# Patient Record
Sex: FEMALE
[De-identification: names, ages, dates, MRNs, and addresses within clinical notes are randomized per-mention and may not be internally consistent; named-entity substitution may affect disease eponyms.]

---

## 2021-08-25 ENCOUNTER — NURSE TRIAGE (OUTPATIENT)
Dept: OTHER | Facility: CLINIC | Age: 36
End: 2021-08-25

## 2021-08-25 NOTE — TELEPHONE ENCOUNTER
Brief description of triage: Pt states on 08/21/21 she ran into a bush with needles and referred to it as a Bur bush. Pt states she had to remove some of the needles with tweezers. Pt then states a couple of days later she developed a rash with itching on the side where the needles were. See assessment below. Triage indicates for patient to see PCP within the next 3 days. Care advice provided, patient verbalizes understanding; denies any other questions or concerns; instructed to call back for any new or worsening symptoms. This triage is a result of a [place] [facility] [place] Please do not respond to the triage nurse through this encounter. Any subsequent communication should be directly with the patient. Reason for Disposition   [1] Severe localized itching AND [2] after 2 days of steroid cream    Answer Assessment - Initial Assessment Questions  1. APPEARANCE of RASH: \"Describe the rash. \"      Scattered raised bumps    2. LOCATION: \"Where is the rash located? \"       On right side    3. NUMBER: \"How many spots are there? \"       Scattered    4. SIZE: \"How big are the spots? \" (Inches, centimeters or compare to size of a coin)       Varies size of eraser to pin tip    5. ONSET: \"When did the rash start? \"       08/23/21    6. ITCHING: \"Does the rash itch? \" If so, ask: \"How bad is the itch? \"  (Scale 1-10; or mild, moderate, severe)      Moderately    7. PAIN: \"Does the rash hurt? \" If so, ask: \"How bad is the pain? \"  (Scale 1-10; or mild, moderate, severe)      0/10    8. OTHER SYMPTOMS: \"Do you have any other symptoms? \" (e.g., fever)      Denies    9. PREGNANCY: \"Is there any chance you are pregnant? \" \"When was your last menstrual period? \"      Denies 08/1/021    Protocols used: RASH OR REDNESS - LOCALIZED-ADULT-

## 2024-04-25 ENCOUNTER — APPOINTMENT (OUTPATIENT)
Dept: CARDIOLOGY | Facility: HOSPITAL | Age: 39
End: 2024-04-25
Payer: COMMERCIAL

## 2024-04-25 ENCOUNTER — APPOINTMENT (OUTPATIENT)
Dept: RADIOLOGY | Facility: HOSPITAL | Age: 39
End: 2024-04-25
Payer: COMMERCIAL

## 2024-04-25 ENCOUNTER — HOSPITAL ENCOUNTER (EMERGENCY)
Facility: HOSPITAL | Age: 39
Discharge: HOME | End: 2024-04-25
Payer: COMMERCIAL

## 2024-04-25 VITALS
HEART RATE: 95 BPM | RESPIRATION RATE: 16 BRPM | SYSTOLIC BLOOD PRESSURE: 140 MMHG | HEIGHT: 64 IN | OXYGEN SATURATION: 95 % | DIASTOLIC BLOOD PRESSURE: 93 MMHG | WEIGHT: 210 LBS | TEMPERATURE: 97.4 F | BODY MASS INDEX: 35.85 KG/M2

## 2024-04-25 DIAGNOSIS — S22.32XA CLOSED FRACTURE OF ONE RIB OF LEFT SIDE, INITIAL ENCOUNTER: Primary | ICD-10-CM

## 2024-04-25 LAB
ATRIAL RATE: 88 BPM
P AXIS: 60 DEGREES
P OFFSET: 213 MS
P ONSET: 156 MS
PR INTERVAL: 148 MS
Q ONSET: 230 MS
QRS COUNT: 14 BEATS
QRS DURATION: 98 MS
QT INTERVAL: 396 MS
QTC CALCULATION(BAZETT): 479 MS
QTC FREDERICIA: 450 MS
R AXIS: -12 DEGREES
T AXIS: 34 DEGREES
T OFFSET: 428 MS
VENTRICULAR RATE: 88 BPM

## 2024-04-25 PROCEDURE — 71045 X-RAY EXAM CHEST 1 VIEW: CPT | Performed by: STUDENT IN AN ORGANIZED HEALTH CARE EDUCATION/TRAINING PROGRAM

## 2024-04-25 PROCEDURE — 2500000005 HC RX 250 GENERAL PHARMACY W/O HCPCS: Performed by: NURSE PRACTITIONER

## 2024-04-25 PROCEDURE — 93005 ELECTROCARDIOGRAM TRACING: CPT

## 2024-04-25 PROCEDURE — 99283 EMERGENCY DEPT VISIT LOW MDM: CPT | Mod: 25

## 2024-04-25 PROCEDURE — 71045 X-RAY EXAM CHEST 1 VIEW: CPT

## 2024-04-25 RX ORDER — LIDOCAINE 50 MG/G
1 PATCH TOPICAL DAILY
Qty: 7 PATCH | Refills: 0 | Status: SHIPPED | OUTPATIENT
Start: 2024-04-25 | End: 2024-05-02

## 2024-04-25 RX ORDER — TIZANIDINE 2 MG/1
4 TABLET ORAL 3 TIMES DAILY PRN
Qty: 180 TABLET | Refills: 0 | Status: SHIPPED | OUTPATIENT
Start: 2024-04-25 | End: 2024-05-25

## 2024-04-25 RX ORDER — NAPROXEN 500 MG/1
500 TABLET ORAL
Qty: 20 TABLET | Refills: 0 | Status: SHIPPED | OUTPATIENT
Start: 2024-04-25 | End: 2024-05-05

## 2024-04-25 RX ORDER — LIDOCAINE 560 MG/1
2 PATCH PERCUTANEOUS; TOPICAL; TRANSDERMAL ONCE
Status: DISCONTINUED | OUTPATIENT
Start: 2024-04-25 | End: 2024-04-25 | Stop reason: HOSPADM

## 2024-04-25 RX ADMIN — LIDOCAINE 4% 2 PATCH: 40 PATCH TOPICAL at 08:53

## 2024-04-25 ASSESSMENT — PAIN SCALES - GENERAL
PAINLEVEL_OUTOF10: 4
PAINLEVEL_OUTOF10: 5 - MODERATE PAIN
PAINLEVEL_OUTOF10: 4
PAINLEVEL_OUTOF10: 0 - NO PAIN

## 2024-04-25 ASSESSMENT — PAIN DESCRIPTION - LOCATION: LOCATION: CHEST

## 2024-04-25 ASSESSMENT — COLUMBIA-SUICIDE SEVERITY RATING SCALE - C-SSRS
1. IN THE PAST MONTH, HAVE YOU WISHED YOU WERE DEAD OR WISHED YOU COULD GO TO SLEEP AND NOT WAKE UP?: NO
6. HAVE YOU EVER DONE ANYTHING, STARTED TO DO ANYTHING, OR PREPARED TO DO ANYTHING TO END YOUR LIFE?: NO
2. HAVE YOU ACTUALLY HAD ANY THOUGHTS OF KILLING YOURSELF?: NO

## 2024-04-25 ASSESSMENT — PAIN DESCRIPTION - ORIENTATION: ORIENTATION: LEFT

## 2024-04-25 ASSESSMENT — PAIN - FUNCTIONAL ASSESSMENT
PAIN_FUNCTIONAL_ASSESSMENT: 0-10
PAIN_FUNCTIONAL_ASSESSMENT: 0-10

## 2024-04-25 NOTE — ED PROVIDER NOTES
HPI   Chief Complaint   Patient presents with    Chest Pain     Patient had a massage recently and when masseuse pressed down on her back she had a moment where she couldn't breathe and felt something was wrong in her chest/back area.       39-year-old female presents today with left-sided rib pain and dyspnea x 3 days.  She is also endorsed a cough that started last night.  She also endorses a headache and she has a long history of migraine headaches.  She used [employer] headache relief and was able to sleep last night.  The left rib pain is rated 5 out of 10.  She felt short of breath when walking in school.  She gave herself a COVID test last night that was negative.  The chest pain is intermittent and it usually only lasts seconds.  The cough is nonproductive.  She is not on birth control but denies pregnancy.  The last time she had sexual intercourse was 3 months ago.  She denies history of smoking.  She denies history of cancer.  She was on a long drive in February to [place].  They would stop every couple hours for gas.  She denies posterior calf pain.  Her legs usually hurt when she ambulates.  She is not on any regular prescribed medication.  She denies any recent trauma or fall.  She denies melena, hematochezia, or hematuria or dysuria.  She denies fever or chills.  Patient notes that she is not actually experiencing chest pain but instead left-sided rib pain which she believes occurred while she was at a spa and she felt something pop when the [employer] therapist pressed into the left lateral ribs.      History provided by:  Patient   used: No                        [place] Coma Scale Score: 15                     Patient History   Past Medical History:   Diagnosis Date    Other obesity due to excess calories 01/21/2021    Class 2 obesity due to excess calories without serious comorbidity with body mass index (BMI) of 36.0 to 36.9 in adult    Other specified counseling 01/21/2021     Educated about COVID-19 virus infection     Past Surgical History:   Procedure Laterality Date    OTHER SURGICAL HISTORY  01/21/2021    Wrist surgery    OTHER SURGICAL HISTORY  01/21/2021    Umbilical hernia repair     No family history on file.  Social History     Tobacco Use    Smoking status: Not on file    Smokeless tobacco: Not on file   Substance Use Topics    Alcohol use: Not on file    Drug use: Not on file       Physical Exam   ED Triage Vitals   Temp Pulse Resp BP   -- -- -- --      SpO2 Temp src Heart Rate Source Patient Position   -- -- -- --      BP Location FiO2 (%)     -- --       Physical Exam  Constitutional:       Appearance: Normal appearance.   HENT:      Head: Normocephalic and atraumatic.      Mouth/Throat:      Mouth: Mucous membranes are dry.   Eyes:      Extraocular Movements: Extraocular movements intact.      Pupils: Pupils are equal, round, and reactive to light.   Cardiovascular:      Rate and Rhythm: Normal rate and regular rhythm.      Pulses: Normal pulses.      Heart sounds: Normal heart sounds.   Pulmonary:      Effort: Pulmonary effort is normal.      Breath sounds: Normal breath sounds.   Abdominal:      General: Abdomen is flat.      Palpations: Abdomen is soft.   Musculoskeletal:         General: Normal range of motion.      Cervical back: Normal range of motion and neck supple.   Skin:     General: Skin is warm.      Capillary Refill: Capillary refill takes less than 2 seconds.   Neurological:      General: No focal deficit present.      Mental Status: She is alert and oriented to person, place, and time.   Psychiatric:         Mood and Affect: Mood normal.         Behavior: Behavior normal.         ED Course & MDM   Diagnoses as of 04/25/24 1003   Closed fracture of one rib of left side, initial encounter       Medical Decision Making  EKG is an incomplete right bundle branch block normal sinus rhythm without any ST elevation or depression.  X-ray confirmed a nondisplaced  "fracture of the left lateral sixth rib.  Patient did have tenderness there and I canceled all lab work as this explained to her \"chest pain\" which was really left lateral rib pain.  I ordered an incentive spirometer for patient to take home and she can use Naprosyn and tizanidine for her pain.  She received a note for school.  She is received information on rib fractures.  Return precautions reviewed and safely discharged home.    Amount and/or Complexity of Data Reviewed  ECG/medicine tests: ordered and independent interpretation performed.     Details: EKG is 88 bpm.  Normal sinus rhythm with an incomplete right bundle branch block.  No ST elevation or depression.  There is no prior EKG to compare to.  SC interval is normal.  QT corrected is normal.  Interpreted both by attending and myself.        Procedure  Procedures     Chetan Barlow, JOSH-CNP  04/25/24 1003    "

## 2024-04-25 NOTE — Clinical Note
Shirlene Valdez was seen and treated in our emergency department on 4/25/2024.  She may return to work on 04/26/2024.       If you have any questions or concerns, please don't hesitate to call.      Chetan Barlow, APRN-CNP